# Patient Record
Sex: MALE | Race: WHITE | NOT HISPANIC OR LATINO | URBAN - METROPOLITAN AREA
[De-identification: names, ages, dates, MRNs, and addresses within clinical notes are randomized per-mention and may not be internally consistent; named-entity substitution may affect disease eponyms.]

---

## 2019-08-10 ENCOUNTER — EMERGENCY (EMERGENCY)
Facility: HOSPITAL | Age: 59
LOS: 1 days | Discharge: HOME | End: 2019-08-10
Attending: EMERGENCY MEDICINE
Payer: COMMERCIAL

## 2019-08-10 VITALS
HEART RATE: 98 BPM | HEIGHT: 76 IN | OXYGEN SATURATION: 99 % | DIASTOLIC BLOOD PRESSURE: 76 MMHG | WEIGHT: 182.98 LBS | TEMPERATURE: 98 F | SYSTOLIC BLOOD PRESSURE: 132 MMHG | RESPIRATION RATE: 18 BRPM

## 2019-08-10 PROCEDURE — 99283 EMERGENCY DEPT VISIT LOW MDM: CPT

## 2019-08-10 PROCEDURE — 99283 EMERGENCY DEPT VISIT LOW MDM: CPT | Mod: 25

## 2019-08-10 NOTE — ED ADULT TRIAGE NOTE - CHIEF COMPLAINT QUOTE
patient s/p rezume procedure yesterday with enriquez cath insertion; now there is leakage around penis area; +hematuria

## 2019-08-10 NOTE — ED PROVIDER NOTE - NSFOLLOWUPINSTRUCTIONS_ED_ALL_ED_FT
You came to the hospital for a leaking enriquez and blood in your urine. We called your urologist who suggested flushing the enriquez and prescribing an antispasmodic agent. Please take the prescribed Dicyclomine as directed and stop taking it Monday night in preparation for your enriquez removal on Wednesday. If the amount of blood in your urine increases, if you develop fevers or increased pain, please return to the ED. Please follow up with your urologist next week.

## 2019-08-10 NOTE — ED PROVIDER NOTE - OBJECTIVE STATEMENT
59M pmh HTN, asthma, BPH presents to the ED for hematuria and urine around enriquez s/p Rezum procedure yesterday. Pt reports that after procedure, enriquez was working well and that there was blood in enriquez bag. Enriquez was draining well all night, however, this AM, noticed that there was urine coming around the enriquez and  there was blood again in the enriquez bag. He called his urologist Dr. Young Ruffin ([phone]) who told pt to come to ED. Since his drive to the ED, pt noted about 150cc of urine in enriquez bag and no longer leaking around the enriquez. Pt denies pain in the penis, fevers, discharge. Pt denies fevers, chills, palpitations, chest pain, SOB, abd pain, n/v/d/c

## 2019-08-10 NOTE — ED PROVIDER NOTE - PROGRESS NOTE DETAILS
Faisal SIMON PGY3: spoke with Dr. Ruffin who recommended flushing enriquez as normal, and to send pt home on an antispasmodics for prostate spasms. Faisal SIMON PGY3: enriquez irrigated, no issues, no blood. pt to be discharged with instructions to follow up with urology and given antispasmotics

## 2019-08-10 NOTE — ED PROVIDER NOTE - CLINICAL SUMMARY MEDICAL DECISION MAKING FREE TEXT BOX
59M s/p prostate procedure with enriquez now with hematuria and urine around enriquez. will reach out to Dr. Dsouza to see what he wishes us to do with enriquez.

## 2019-08-10 NOTE — ED PROVIDER NOTE - ATTENDING CONTRIBUTION TO CARE
Dr. Valentine : I have personally seen and examined this patient at the bedside. I have fully participated in the care of this patient. I have reviewed all pertinent clinical information, including history, physical exam, plan and the Resident's note and agree except as noted.       59M pmh HTN, asthma, BPH presents to the ED for hematuria and urine around enriquez s/p Rezum procedure yesterday. for BPH. Pt reports that after procedure, enriquez was working well and that there was blood in enriquez bag. Enriquez was draining well all night, however, this AM, noticed that there was urine coming around the enriquez and  there was blood again in the enriquez bag. notes since 1 episode of overflow around the catheter none since then   Denies f/c/n/v/cp/sob/palpitations/cough/abd.pain/d/c/dysuria. no sick contacts/recent travel. on prophylactic abx thinks cipro; follow up scheduled for wednesday to get a enriquez out. no clots in turine    PE:  head; atraumatic normocephalic  eyes: perrla  Heart: rrr s1s2  lungs: ctab  abd: soft, nt nd + bs no rebound/guarding no cva ttp gu: enriquez draining  le: no swelling no calf ttp  back: no midline cervical/thoracic/lumbar ttp    -->pt well appering no other complaints will flush enriquez; dc with urology follow up

## 2019-08-10 NOTE — ED ADULT NURSE NOTE - OBJECTIVE STATEMENT
59 yr old male to the ED , accomp by spouse, h/o asthma HTN. Pt had REZUME procedure 8/9 for enlarged prostate. Was voiding without any problems via enriquez catheter, however , felt this am decreased in urine output. Noted at 8am blood/leakage  around the penile/enriquez site and hematuria. Denies trauma to the area or any pulling. Denies fever , chills, sob, chest pain. C/o slight burning when urinating. Afebrile. Bladder scan done 34 ml

## 2019-09-04 PROBLEM — J45.909 UNSPECIFIED ASTHMA, UNCOMPLICATED: Chronic | Status: ACTIVE | Noted: 2019-08-10

## 2019-09-04 PROBLEM — I10 ESSENTIAL (PRIMARY) HYPERTENSION: Chronic | Status: ACTIVE | Noted: 2019-08-10

## 2019-10-25 ENCOUNTER — APPOINTMENT (OUTPATIENT)
Dept: CARDIOLOGY | Facility: CLINIC | Age: 59
End: 2019-10-25
Payer: COMMERCIAL

## 2019-10-25 ENCOUNTER — NON-APPOINTMENT (OUTPATIENT)
Age: 59
End: 2019-10-25

## 2019-10-25 VITALS
BODY MASS INDEX: 22.65 KG/M2 | DIASTOLIC BLOOD PRESSURE: 80 MMHG | HEIGHT: 76 IN | HEART RATE: 50 BPM | SYSTOLIC BLOOD PRESSURE: 124 MMHG | WEIGHT: 186 LBS

## 2019-10-25 PROCEDURE — 99204 OFFICE O/P NEW MOD 45 MIN: CPT

## 2019-10-25 PROCEDURE — 93000 ELECTROCARDIOGRAM COMPLETE: CPT

## 2019-10-25 RX ORDER — LOSARTAN POTASSIUM 50 MG/1
50 TABLET, FILM COATED ORAL DAILY
Qty: 90 | Refills: 1 | Status: ACTIVE | COMMUNITY

## 2019-10-28 NOTE — HISTORY OF PRESENT ILLNESS
[FreeTextEntry1] : He has a history of hypertension and asthma. In 2008, I saw him as was suffering from frequent palpitations. Echo and stress testing with isotope imaging did not show any significant structural heart disease; the palpitations spontaneously improved. \par \par In Dec. 2013, a stress echo showed no ischemia.  Very mild MR was seen on TTE.\par \par As he presents today, he still experiences occasional palpitations, but overall these have been relatively infrequent, and tend to occur is only isolated premature beats.  There have been no sustained rhythm disturbances.\par \par In stressful circumstances, he does describe a precordial chest discomfort.  This tends to resolve within a few minutes, and does not happen with exertion.  It had been his habit to walk on a treadmill 3 times weekly for at least a mile, which he could do without difficulty from a cardiovascular standpoint; he has not been able to do this recently because of a hamstring injury.  He does not describe exertional dyspnea.  He reports no lightheadedness or loss of consciousness.  He describes no orthopnea or PND.\par \par He brings blood work from his primary care doctor.  Recent lipid profile was reasonable and blood sugar was within normal range.  He has never been a smoker.  His father did have a history of cardiovascular disease, and most notably significant bilateral carotid artery disease, although never suffered cerebrovascular injury, dying eventually of old age.

## 2019-10-28 NOTE — PHYSICAL EXAM
[General Appearance - Well Developed] : well developed [Normal Appearance] : normal appearance [Well Groomed] : well groomed [General Appearance - Well Nourished] : well nourished [General Appearance - In No Acute Distress] : no acute distress [Normal Conjunctiva] : the conjunctiva exhibited no abnormalities [Eyelids - No Xanthelasma] : the eyelids demonstrated no xanthelasmas [Normal Jugular Venous A Waves Present] : normal jugular venous A waves present [Normal Jugular Venous V Waves Present] : normal jugular venous V waves present [Heart Rate And Rhythm] : heart rate and rhythm were normal [Respiration, Rhythm And Depth] : normal respiratory rhythm and effort [Auscultation Breath Sounds / Voice Sounds] : lungs were clear to auscultation bilaterally [Bowel Sounds] : normal bowel sounds [Abnormal Walk] : normal gait [Nail Clubbing] : no clubbing of the fingernails [Cyanosis, Localized] : no localized cyanosis [Skin Color & Pigmentation] : normal skin color and pigmentation [] : no rash [Oriented To Time, Place, And Person] : oriented to person, place, and time [Impaired Insight] : insight and judgment were intact [Affect] : the affect was normal [Mood] : the mood was normal [FreeTextEntry1] : 2+ pulses in upper and lower extremities. No edema.

## 2019-10-28 NOTE — REASON FOR VISIT
[FreeTextEntry1] : \kelvin Winston Raza presents today for cardiac evaluation.  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

## 2019-10-28 NOTE — DISCUSSION/SUMMARY
[FreeTextEntry1] : \par Hypertension -blood pressure well controlled at present on current dose of losartan.  He will continue this.\par \par Palpitations/VPCs -relatively infrequent at this time; he will continue verapamil but does not need any other medication.\par \par Given his complaint of chest discomfort and his family history of cardiovascular disease, and his risk factor of hypertension, I asked him to return at his convenience for an exercise test.  I think this is best done with an echocardiogram, given the interventricular conduction delay seen on EKG today, as well as his prior history of a myxomatous mitral valve.

## 2019-11-25 ENCOUNTER — APPOINTMENT (OUTPATIENT)
Dept: CARDIOLOGY | Facility: CLINIC | Age: 59
End: 2019-11-25

## 2019-12-16 ENCOUNTER — APPOINTMENT (OUTPATIENT)
Dept: CARDIOLOGY | Facility: CLINIC | Age: 59
End: 2019-12-16
Payer: COMMERCIAL

## 2019-12-16 PROCEDURE — 93320 DOPPLER ECHO COMPLETE: CPT

## 2019-12-16 PROCEDURE — 93325 DOPPLER ECHO COLOR FLOW MAPG: CPT

## 2019-12-16 PROCEDURE — 93351 STRESS TTE COMPLETE: CPT

## 2019-12-30 ENCOUNTER — APPOINTMENT (OUTPATIENT)
Dept: CARDIOLOGY | Facility: CLINIC | Age: 59
End: 2019-12-30
Payer: COMMERCIAL

## 2019-12-30 PROCEDURE — 93880 EXTRACRANIAL BILAT STUDY: CPT

## 2020-01-08 ENCOUNTER — MEDICATION RENEWAL (OUTPATIENT)
Age: 60
End: 2020-01-08

## 2020-05-29 ENCOUNTER — RX RENEWAL (OUTPATIENT)
Age: 60
End: 2020-05-29

## 2020-10-20 ENCOUNTER — RX RENEWAL (OUTPATIENT)
Age: 60
End: 2020-10-20

## 2021-01-11 ENCOUNTER — RX RENEWAL (OUTPATIENT)
Age: 61
End: 2021-01-11

## 2021-04-02 ENCOUNTER — RX RENEWAL (OUTPATIENT)
Age: 61
End: 2021-04-02

## 2021-06-23 ENCOUNTER — RX RENEWAL (OUTPATIENT)
Age: 61
End: 2021-06-23

## 2021-06-30 ENCOUNTER — APPOINTMENT (OUTPATIENT)
Dept: CARDIOLOGY | Facility: CLINIC | Age: 61
End: 2021-06-30
Payer: COMMERCIAL

## 2021-06-30 ENCOUNTER — NON-APPOINTMENT (OUTPATIENT)
Age: 61
End: 2021-06-30

## 2021-06-30 VITALS
BODY MASS INDEX: 22.89 KG/M2 | DIASTOLIC BLOOD PRESSURE: 70 MMHG | HEIGHT: 76 IN | SYSTOLIC BLOOD PRESSURE: 120 MMHG | HEART RATE: 61 BPM | WEIGHT: 188 LBS | OXYGEN SATURATION: 99 %

## 2021-06-30 PROCEDURE — 93000 ELECTROCARDIOGRAM COMPLETE: CPT

## 2021-06-30 PROCEDURE — 99214 OFFICE O/P EST MOD 30 MIN: CPT

## 2021-06-30 PROCEDURE — 99072 ADDL SUPL MATRL&STAF TM PHE: CPT

## 2021-07-05 NOTE — DISCUSSION/SUMMARY
[FreeTextEntry1] : \par Hypertension - blood pressure remains well controlled at this time; he will continue the present dose of losartan.  \par \par Palpitations/VPCs - infrequent at this time; he will continue verapamil.\par \par Given the mild plaque seen on his carotid scan last year, he will continue on the present dose of atorvastatin.  \par \par We will request a copy of the recent blood work from his primary care doctor, Dr. Sadler.\par \par I asked him to return here in the 1 year for a follow-up visit.

## 2021-07-05 NOTE — REASON FOR VISIT
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

## 2021-07-05 NOTE — HISTORY OF PRESENT ILLNESS
[FreeTextEntry1] : He has a history of hypertension.  He has a hx. of occasional palpitations.  An ex-echo in 12/2019 showed no evidence of ischemia. Mild MR was seen. A carotid Doppler study that month showed minor plaque.  He was started on a cholesterol-lowering medication. \par \par As he returns today, he has been well overall.  He reports occasional palpitations, currently approximately once per month.  These seem to occur as isolated premature beats, perhaps 2 or 3 within a few moments with spontaneous resolution.  He does not seem to have had any sustained, ongoing rhythm disturbances.  In retrospect, he is working mostly from home, which in general is less stressful, and may be the reason his palpitations have been less frequent.\par \par With his activities he describes no exertional chest discomfort or dyspnea.  There have been no episodes of orthopnea or PND.  He describes no lightheadedness and no syncopal events.\par \par Medications are unchanged.  He continues to tolerate them without adverse effect.\par \par

## 2022-07-26 PROBLEM — I51.89 FAMILIAL HEART DISEASE: Status: ACTIVE | Noted: 2019-10-25

## 2022-07-27 ENCOUNTER — APPOINTMENT (OUTPATIENT)
Dept: CARDIOLOGY | Facility: CLINIC | Age: 62
End: 2022-07-27

## 2022-07-27 ENCOUNTER — NON-APPOINTMENT (OUTPATIENT)
Age: 62
End: 2022-07-27

## 2022-07-27 VITALS
WEIGHT: 182 LBS | SYSTOLIC BLOOD PRESSURE: 98 MMHG | HEART RATE: 60 BPM | DIASTOLIC BLOOD PRESSURE: 78 MMHG | BODY MASS INDEX: 22.16 KG/M2 | OXYGEN SATURATION: 100 % | HEIGHT: 76 IN | RESPIRATION RATE: 15 BRPM

## 2022-07-27 DIAGNOSIS — I51.89 OTHER ILL-DEFINED HEART DISEASES: ICD-10-CM

## 2022-07-27 PROCEDURE — 99214 OFFICE O/P EST MOD 30 MIN: CPT | Mod: 25

## 2022-07-27 PROCEDURE — 93000 ELECTROCARDIOGRAM COMPLETE: CPT

## 2022-07-27 NOTE — HISTORY OF PRESENT ILLNESS
[FreeTextEntry1] : He has a history of hypertension and occasional palpitations.  Ex-echo in 12/2019 showed no evidence of ischemia. Mild MR was seen. A carotid Doppler study that month showed minor plaque.  He was started on a cholesterol-lowering medication. \par \par As he returns today, he remains well.  As in the past, he reports occasional palpitations, as isolated premature beats, which occur infrequently.  There have been no sustained rhythm disturbances.  He continues to work from home for the most part, which he finds less stressful.\par \par There have been no episodes of exertional chest discomfort or dyspnea.  He reports no orthopnea or PND.  There hae been no episodes of lightheadedness and no syncopal events.\par \par Medications are unchanged.  He continues to tolerate them without adverse effect.\par \par

## 2023-04-25 ENCOUNTER — RX RENEWAL (OUTPATIENT)
Age: 63
End: 2023-04-25

## 2023-07-26 ENCOUNTER — APPOINTMENT (OUTPATIENT)
Dept: CARDIOLOGY | Facility: CLINIC | Age: 63
End: 2023-07-26

## 2023-07-28 ENCOUNTER — APPOINTMENT (OUTPATIENT)
Dept: CARDIOLOGY | Facility: CLINIC | Age: 63
End: 2023-07-28

## 2023-10-12 PROBLEM — I10 HTN (HYPERTENSION): Status: ACTIVE | Noted: 2019-10-25

## 2023-10-12 PROBLEM — I65.29 CAROTID ARTERY PLAQUE: Status: ACTIVE | Noted: 2020-01-08

## 2023-10-13 ENCOUNTER — NON-APPOINTMENT (OUTPATIENT)
Age: 63
End: 2023-10-13

## 2023-10-13 ENCOUNTER — APPOINTMENT (OUTPATIENT)
Dept: CARDIOLOGY | Facility: CLINIC | Age: 63
End: 2023-10-13
Payer: COMMERCIAL

## 2023-10-13 VITALS
HEART RATE: 60 BPM | HEIGHT: 76 IN | OXYGEN SATURATION: 100 % | DIASTOLIC BLOOD PRESSURE: 80 MMHG | SYSTOLIC BLOOD PRESSURE: 118 MMHG | WEIGHT: 180 LBS | BODY MASS INDEX: 21.92 KG/M2

## 2023-10-13 VITALS — SYSTOLIC BLOOD PRESSURE: 130 MMHG | DIASTOLIC BLOOD PRESSURE: 76 MMHG

## 2023-10-13 DIAGNOSIS — R00.2 PALPITATIONS: ICD-10-CM

## 2023-10-13 DIAGNOSIS — I65.29 OCCLUSION AND STENOSIS OF UNSPECIFIED CAROTID ARTERY: ICD-10-CM

## 2023-10-13 DIAGNOSIS — I10 ESSENTIAL (PRIMARY) HYPERTENSION: ICD-10-CM

## 2023-10-13 DIAGNOSIS — I34.0 NONRHEUMATIC MITRAL (VALVE) INSUFFICIENCY: ICD-10-CM

## 2023-10-13 PROCEDURE — 93880 EXTRACRANIAL BILAT STUDY: CPT

## 2023-10-13 PROCEDURE — 99214 OFFICE O/P EST MOD 30 MIN: CPT | Mod: 25

## 2023-10-13 PROCEDURE — 93000 ELECTROCARDIOGRAM COMPLETE: CPT

## 2023-10-22 ENCOUNTER — RX RENEWAL (OUTPATIENT)
Age: 63
End: 2023-10-22

## 2023-10-23 RX ORDER — ATORVASTATIN CALCIUM 10 MG/1
10 TABLET, FILM COATED ORAL
Qty: 90 | Refills: 3 | Status: ACTIVE | COMMUNITY
Start: 2020-01-08 | End: 1900-01-01

## 2024-10-11 ENCOUNTER — APPOINTMENT (OUTPATIENT)
Dept: CARDIOLOGY | Facility: CLINIC | Age: 64
End: 2024-10-11
Payer: COMMERCIAL

## 2024-10-11 ENCOUNTER — NON-APPOINTMENT (OUTPATIENT)
Age: 64
End: 2024-10-11

## 2024-10-11 VITALS
OXYGEN SATURATION: 99 % | BODY MASS INDEX: 22.41 KG/M2 | DIASTOLIC BLOOD PRESSURE: 76 MMHG | HEIGHT: 76 IN | SYSTOLIC BLOOD PRESSURE: 120 MMHG | WEIGHT: 184 LBS | HEART RATE: 65 BPM

## 2024-10-11 DIAGNOSIS — R00.2 PALPITATIONS: ICD-10-CM

## 2024-10-11 DIAGNOSIS — I65.29 OCCLUSION AND STENOSIS OF UNSPECIFIED CAROTID ARTERY: ICD-10-CM

## 2024-10-11 DIAGNOSIS — I10 ESSENTIAL (PRIMARY) HYPERTENSION: ICD-10-CM

## 2024-10-11 DIAGNOSIS — I34.0 NONRHEUMATIC MITRAL (VALVE) INSUFFICIENCY: ICD-10-CM

## 2024-10-11 PROCEDURE — 93000 ELECTROCARDIOGRAM COMPLETE: CPT

## 2024-10-11 PROCEDURE — 99214 OFFICE O/P EST MOD 30 MIN: CPT | Mod: 25

## 2024-10-11 RX ORDER — SERTRALINE 25 MG/1
25 TABLET, FILM COATED ORAL
Qty: 90 | Refills: 3 | Status: ACTIVE | COMMUNITY

## 2024-10-18 ENCOUNTER — OUTPATIENT (OUTPATIENT)
Dept: OUTPATIENT SERVICES | Facility: HOSPITAL | Age: 64
LOS: 1 days | End: 2024-10-18
Payer: COMMERCIAL

## 2024-10-18 ENCOUNTER — TRANSCRIPTION ENCOUNTER (OUTPATIENT)
Age: 64
End: 2024-10-18

## 2024-10-18 VITALS
SYSTOLIC BLOOD PRESSURE: 137 MMHG | RESPIRATION RATE: 20 BRPM | WEIGHT: 182.98 LBS | OXYGEN SATURATION: 100 % | HEIGHT: 76 IN | DIASTOLIC BLOOD PRESSURE: 64 MMHG | HEART RATE: 59 BPM | TEMPERATURE: 98 F

## 2024-10-18 VITALS
RESPIRATION RATE: 18 BRPM | OXYGEN SATURATION: 100 % | SYSTOLIC BLOOD PRESSURE: 115 MMHG | HEART RATE: 62 BPM | DIASTOLIC BLOOD PRESSURE: 61 MMHG

## 2024-10-18 DIAGNOSIS — K21.9 GASTRO-ESOPHAGEAL REFLUX DISEASE WITHOUT ESOPHAGITIS: ICD-10-CM

## 2024-10-18 DIAGNOSIS — Z80.0 FAMILY HISTORY OF MALIGNANT NEOPLASM OF DIGESTIVE ORGANS: ICD-10-CM

## 2024-10-18 DIAGNOSIS — Z86.010 PERSONAL HISTORY OF COLON POLYPS: ICD-10-CM

## 2024-10-18 PROCEDURE — 43239 EGD BIOPSY SINGLE/MULTIPLE: CPT

## 2024-10-18 PROCEDURE — G0105: CPT

## 2024-10-18 PROCEDURE — 88305 TISSUE EXAM BY PATHOLOGIST: CPT

## 2024-10-18 PROCEDURE — 88305 TISSUE EXAM BY PATHOLOGIST: CPT | Mod: 26

## 2024-10-18 DEVICE — NET RETRV ROT ROTH 2.5MMX230CM: Type: IMPLANTABLE DEVICE | Status: FUNCTIONAL

## 2024-10-18 RX ORDER — SILODOSIN 8 MG/1
1 CAPSULE ORAL
Refills: 0 | DISCHARGE

## 2024-10-18 RX ORDER — SODIUM CHLORIDE 0.9 % (FLUSH) 0.9 %
500 SYRINGE (ML) INJECTION
Refills: 0 | Status: ACTIVE | OUTPATIENT
Start: 2024-10-18

## 2024-10-18 RX ORDER — ALBUTEROL 90 MCG
2 AEROSOL (GRAM) INHALATION
Refills: 0 | DISCHARGE

## 2024-10-18 RX ORDER — LOSARTAN POTASSIUM 100 MG/1
1 TABLET, FILM COATED ORAL
Refills: 0 | DISCHARGE

## 2024-10-18 RX ORDER — BUDESONIDE, MICRONIZED 100 %
1 POWDER (GRAM) MISCELLANEOUS
Refills: 0 | DISCHARGE

## 2024-10-18 RX ORDER — SERTRALINE HYDROCHLORIDE 100 MG/1
1 TABLET, FILM COATED ORAL
Refills: 0 | DISCHARGE

## 2024-10-18 NOTE — PRE-ANESTHESIA EVALUATION ADULT - NSANTHOSAYNRD_GEN_A_CORE
No. ИРИНА screening performed.  STOP BANG Legend: 0-2 = LOW Risk; 3-4 = INTERMEDIATE Risk; 5-8 = HIGH Risk

## 2024-10-18 NOTE — ASU DISCHARGE PLAN (ADULT/PEDIATRIC) - FINANCIAL ASSISTANCE
[facility] provides services at a reduced cost to those who are determined to be eligible through [facility]’s financial assistance program. Information regarding [facility]’s financial assistance program can be found by going to [email or web address] or by calling [phone].

## 2024-10-18 NOTE — PRE PROCEDURE NOTE - PRE PROCEDURE EVALUATION
Pre-Endoscopy Evaluation      Referring Physician:                                    Procedure:  upper endoscopy / colonoscopy  Indication for Procedure:  reflux / history of colon polyps  Pertinent History:    Sedation by Anesthesia [ x]    PAST MEDICAL & SURGICAL HISTORY:  HTN (hypertension)      Asthma          PMH of Gastroparesis [ ]  Gastric Surgery [ ]  Gastric Outlet Obstruction [ ]    Allergies    No Known Allergies    Intolerances        Latex allergy: [ ] yes [ ] no    Medications:MEDICATIONS  (STANDING):  sodium chloride 0.9%. 500 milliLiter(s) (75 mL/Hr) IV Continuous <Continuous>    MEDICATIONS  (PRN):      Smoking: [ ] yes  [ ] no    AICD/PPM: [ ] yes   [ ] no    Pertinent lab data:                        Physical Examination:  Daily Height in cm: 193.04 (18 Oct 2024 10:20)    Daily   Vital Signs Last 24 Hrs  T(C): 36.6 (18 Oct 2024 10:20), Max: 36.6 (18 Oct 2024 10:20)  T(F): 97.9 (18 Oct 2024 10:20), Max: 97.9 (18 Oct 2024 10:20)  HR: 59 (18 Oct 2024 10:20) (59 - 59)  BP: 137/64 (18 Oct 2024 10:20) (137/64 - 137/64)  BP(mean): --  RR: 20 (18 Oct 2024 10:20) (20 - 20)  SpO2: 100% (18 Oct 2024 10:20) (100% - 100%)    Parameters below as of 18 Oct 2024 10:20  Patient On (Oxygen Delivery Method): room air        BP:                 HR:                  SPO2:               Temperature:    Constitutional: NAD    HEENT: PERRLA, EOMI,       Neck:  No JVD    Respiratory: CTAB/L    Cardiovascular: S1 and S2    Gastrointestinal: BS+, soft, NT/ND    Extremities: No peripheral edema    Neurological: A/O x 3, no focal deficits    Psychiatric: Normal mood, normal affect    : No Harrison    Skin: No rashes    Comments:    ASA Class: I [ ]  II [x ]  III [ ]  IV [ ]    The patient is a suitable candidate for the planned procedure unless box checked [ ]  No, explain:

## 2024-10-18 NOTE — ASU DISCHARGE PLAN (ADULT/PEDIATRIC) - CARE PROVIDER_API CALL
Roland Trotter  Gastroenterology  [address], [address]  [place] [address]  Phone: [phone]  Fax: [phone]  Follow Up Time: 2 weeks

## 2024-10-21 LAB — SURGICAL PATHOLOGY STUDY: SIGNIFICANT CHANGE UP

## 2025-05-18 NOTE — DISCUSSION/SUMMARY
[EKG obtained to assist in diagnosis and management of assessed problem(s)] : EKG obtained to assist in diagnosis and management of assessed problem(s) [FreeTextEntry1] : Hypertension - blood pressure in good range; continue losartan.  \par \par Palpitations/VPCs - infrequent at this time; continue verapamil.\par \par Given the mild plaque seen on his carotid scan last year, he will continue on the present dose of atorvastatin.  Will arrange f/u scan with next O.V.\par \par Blood work checked by PCP in June; he will request a copy for us.  In retrospect, he held Lipitor for a few weeks in the spring, concerned that it might adversely affect urine flow.  He found that it did not and then resumed it; lipid profile may be less accurate due to holding med.\par \par 31 minutes spent on today's office visit. \par \par I asked him to return here in the 1 year for a follow-up visit with a carotid Doppler scan. Withheld/Decline to Answer

## (undated) DEVICE — BITE BLOCK ADULT 20 X 27MM (GREEN)

## (undated) DEVICE — SUCTION YANKAUER NO CONTROL VENT

## (undated) DEVICE — FOLEY HOLDER STATLOCK 2 WAY ADULT

## (undated) DEVICE — BALLOON US ENDO

## (undated) DEVICE — POLY TRAP ETRAP

## (undated) DEVICE — CATH IV SAFE BC 20G X 1.16" (PINK)

## (undated) DEVICE — BRUSH COLONOSCOPY CYTOLOGY

## (undated) DEVICE — FORCEP RADIAL JAW 4 JUMBO 2.8MM 3.2MM 240CM ORANGE DISP

## (undated) DEVICE — SENSOR O2 FINGER ADULT

## (undated) DEVICE — CLAMP BX HOT RAD JAW 3

## (undated) DEVICE — SOL INJ NS 0.9% 500ML 2 PORT

## (undated) DEVICE — BIOPSY FORCEP RADIAL JAW 4 STANDARD WITH NEEDLE

## (undated) DEVICE — SYR LUER LOK 50CC

## (undated) DEVICE — TUBING IV SET GRAVITY 3Y 100" MACRO

## (undated) DEVICE — CATH IV SAFE BC 22G X 1" (BLUE)

## (undated) DEVICE — TUBING SUCTION CONN 6FT STERILE

## (undated) DEVICE — TUBING SUCTION 20FT

## (undated) DEVICE — IRRIGATOR BIO SHIELD

## (undated) DEVICE — PACK IV START WITH CHG

## (undated) DEVICE — SYR ALLIANCE II INFLATION 60ML

## (undated) DEVICE — ELCTR GROUNDING PAD ADULT COVIDIEN